# Patient Record
Sex: FEMALE | Race: WHITE | Employment: UNEMPLOYED | URBAN - NONMETROPOLITAN AREA
[De-identification: names, ages, dates, MRNs, and addresses within clinical notes are randomized per-mention and may not be internally consistent; named-entity substitution may affect disease eponyms.]

---

## 2024-10-02 ENCOUNTER — OFFICE VISIT (OUTPATIENT)
Dept: OBGYN | Age: 24
End: 2024-10-02
Payer: COMMERCIAL

## 2024-10-02 ENCOUNTER — HOSPITAL ENCOUNTER (OUTPATIENT)
Age: 24
Setting detail: SPECIMEN
Discharge: HOME | End: 2024-10-02
Payer: COMMERCIAL

## 2024-10-02 VITALS
BODY MASS INDEX: 42.37 KG/M2 | HEIGHT: 64 IN | SYSTOLIC BLOOD PRESSURE: 118 MMHG | DIASTOLIC BLOOD PRESSURE: 76 MMHG | WEIGHT: 248.2 LBS

## 2024-10-02 DIAGNOSIS — Z01.419 ENCOUNTER FOR ANNUAL ROUTINE GYNECOLOGICAL EXAMINATION: Primary | ICD-10-CM

## 2024-10-02 DIAGNOSIS — N94.10 DYSPAREUNIA IN FEMALE: ICD-10-CM

## 2024-10-02 DIAGNOSIS — N89.8 VAGINAL DISCHARGE: ICD-10-CM

## 2024-10-02 DIAGNOSIS — N92.6 IRREGULAR MENSES: ICD-10-CM

## 2024-10-02 PROCEDURE — 99395 PREV VISIT EST AGE 18-39: CPT | Performed by: ADVANCED PRACTICE MIDWIFE

## 2024-10-02 PROCEDURE — G8484 FLU IMMUNIZE NO ADMIN: HCPCS | Performed by: ADVANCED PRACTICE MIDWIFE

## 2024-10-02 PROCEDURE — 87070 CULTURE OTHR SPECIMN AEROBIC: CPT

## 2024-10-02 RX ORDER — NORGESTIMATE AND ETHINYL ESTRADIOL 0.25-0.035
1 KIT ORAL DAILY
Qty: 1 PACKET | Refills: 12 | Status: SHIPPED | OUTPATIENT
Start: 2024-10-02

## 2024-10-02 SDOH — ECONOMIC STABILITY: FOOD INSECURITY: WITHIN THE PAST 12 MONTHS, YOU WORRIED THAT YOUR FOOD WOULD RUN OUT BEFORE YOU GOT MONEY TO BUY MORE.: NEVER TRUE

## 2024-10-02 SDOH — ECONOMIC STABILITY: FOOD INSECURITY: WITHIN THE PAST 12 MONTHS, THE FOOD YOU BOUGHT JUST DIDN'T LAST AND YOU DIDN'T HAVE MONEY TO GET MORE.: NEVER TRUE

## 2024-10-02 SDOH — ECONOMIC STABILITY: INCOME INSECURITY: HOW HARD IS IT FOR YOU TO PAY FOR THE VERY BASICS LIKE FOOD, HOUSING, MEDICAL CARE, AND HEATING?: SOMEWHAT HARD

## 2024-10-02 ASSESSMENT — ENCOUNTER SYMPTOMS
ABDOMINAL PAIN: 0
SHORTNESS OF BREATH: 0
BACK PAIN: 0

## 2024-10-02 NOTE — PROGRESS NOTES
Partners: Male    Birth control/protection: Pill       Any bleeding or pain with intercourse: Yes    Last Yearly date:  08/07/2023    Last pap date : Date of last Cervical Cancer screen (HPV or PAP): 8/7/2023     results: negative    Last HPV date and results: never    Has pt ever had an abnormal:No    IF yes result and date: never     Mammogram Result (most recent):  No results found for this or any previous visit from the past 3650 days.        DEXA Result (most recent):  No results found for this or any previous visit from the past 3650 days.        Last colorectal screen- type:never  date  never results never    Do you do self breast exams: No    Past Medical History:   Diagnosis Date    Encounter for planned induction of labor 02/15/2024    Irregular menses     Seasonal allergies        Past Surgical History:   Procedure Laterality Date    WISDOM TOOTH EXTRACTION  2022       Family History   Problem Relation Age of Onset    Cancer Paternal Grandfather     No Known Problems Paternal Grandmother     No Known Problems Maternal Grandmother     Parkinson's Disease Maternal Grandfather     No Known Problems Father     High Blood Pressure Mother     No Known Problems Sister     Breast Cancer Neg Hx     Ovarian Cancer Neg Hx        Chief Complaint   Patient presents with    Gynecologic Exam     Yearly exam. Last pap 08/07/2023 negative. C/O painful intercourse since birth of baby . Patient is no longer breastfeeding. Currently taking Ortho Micronor.           PE:  Vital Signs  Blood pressure 118/76, height 1.626 m (5' 4\"), weight 112.6 kg (248 lb 3.2 oz), last menstrual period 08/16/2024, not currently breastfeeding.  Estimated body mass index is 42.6 kg/m² as calculated from the following:    Height as of this encounter: 1.626 m (5' 4\").    Weight as of this encounter: 112.6 kg (248 lb 3.2 oz).    Labs:    No results found for this visit on 10/02/24.    No data recorded    NURSE: Young MESA    HPI: here for annual

## 2024-10-05 LAB
MICROORGANISM SPEC CULT: ABNORMAL
SERVICE CMNT-IMP: ABNORMAL
SPECIMEN DESCRIPTION: ABNORMAL

## 2024-10-07 RX ORDER — FLUCONAZOLE 150 MG/1
150 TABLET ORAL DAILY
Qty: 3 TABLET | Refills: 0 | Status: SHIPPED | OUTPATIENT
Start: 2024-10-07 | End: 2024-10-10

## 2025-01-06 ENCOUNTER — OFFICE VISIT (OUTPATIENT)
Dept: OBGYN | Age: 25
End: 2025-01-06
Payer: COMMERCIAL

## 2025-01-06 ENCOUNTER — HOSPITAL ENCOUNTER (OUTPATIENT)
Age: 25
Setting detail: SPECIMEN
Discharge: HOME | End: 2025-01-06
Payer: COMMERCIAL

## 2025-01-06 VITALS — HEIGHT: 64 IN | BODY MASS INDEX: 42.34 KG/M2 | WEIGHT: 248 LBS

## 2025-01-06 DIAGNOSIS — O20.0 THREATENED ABORTION: Primary | ICD-10-CM

## 2025-01-06 PROCEDURE — 84702 CHORIONIC GONADOTROPIN TEST: CPT

## 2025-01-06 PROCEDURE — 99213 OFFICE O/P EST LOW 20 MIN: CPT | Performed by: ADVANCED PRACTICE MIDWIFE

## 2025-01-06 NOTE — PROGRESS NOTES
PROBLEM VISIT     Date of service: 2025    Lovely Valladares  Is a 24 y.o. , female    PT's PCP is: Fely Washington APRN - CNP     : 2000                                             Subjective:       No LMP recorded (lmp unknown).   OB History    Para Term  AB Living   1 1 1     1   SAB IAB Ectopic Molar Multiple Live Births           0 1      # Outcome Date GA Lbr Lai/2nd Weight Sex Type Anes PTL Lv   1 Term 24 39w3d / 01:04 3.756 kg (8 lb 4.5 oz) F Vag-Spont EPI N ECLESTINE      Complications: Gestational hypertension, third trimester, Pre-eclampsia, mild        Social History     Tobacco Use   Smoking Status Never   Smokeless Tobacco Never        Social History     Substance and Sexual Activity   Alcohol Use Yes    Alcohol/week: 1.0 standard drink of alcohol    Types: 1 Glasses of wine per week    Comment: social       Allergies: Pcn [penicillins]      Current Outpatient Medications:     norgestimate-ethinyl estradiol (SPRINTEC 28) 0.25-35 MG-MCG per tablet, Take 1 tablet by mouth daily (Patient not taking: Reported on 2025), Disp: 1 packet, Rfl: 12    norethindrone (ORTHO MICRONOR) 0.35 MG tablet, Take 1 tablet by mouth daily (Patient not taking: Reported on 2025), Disp: 28 tablet, Rfl: 6    ibuprofen (ADVIL;MOTRIN) 800 MG tablet, Take 1 tablet by mouth every 8 hours as needed for Pain (Patient not taking: Reported on 2025), Disp: 120 tablet, Rfl: 3    Social History     Substance and Sexual Activity   Sexual Activity Yes    Partners: Male    Birth control/protection: Pill       Last Yearly date:  10/2/24    Last pap date and results:  Normal    Last HPV date and results: Never    Chief Complaint   Patient presents with    Abnormal Ultrasound     Patient is being seen to discuss dating US.          PE:  Vital Signs  Height 1.626 m (5' 4\"), weight 112.5 kg (248 lb), not currently breastfeeding.  Estimated body mass index is 42.57 kg/m² as calculated from the

## 2025-01-07 DIAGNOSIS — O20.0 THREATENED ABORTION: ICD-10-CM

## 2025-01-07 LAB — B-HCG SERPL EIA 3RD IS-ACNC: ABNORMAL MIU/ML (ref 0–7)

## 2025-01-07 NOTE — PROGRESS NOTES
Sono today is not as far along as patient thought she should be although she is not sure of lmp/ ovulation.    Sono  6.1 WK IUP  CL:3.9 cm  HR:no fetal heart motion or color flow visualized  RT. OVARY:not visualized.  LT. OVARY:seen, corpus luteum cyst visualized measures 2.0 x 1.7 x 1.5 cm.   Abnormal yolk sac appearance.     Will follow quant hcg and recheck sono in one week                              Assessment and Plan          Diagnosis Orders   1. Threatened   hCG, Quantitative, Pregnancy                I have discontinued Lovely T. Depinet's ibuprofen, norethindrone, and norgestimate-ethinyl estradiol.    Return in about 1 week (around 2025) for ob sono for viability.    She was also counseled on her preventative health maintenance recommendations and follow-up.     There are no Patient Instructions on file for this visit.    BECKY Connell CNM,2025 11:11 PM

## 2025-01-07 NOTE — RESULT ENCOUNTER NOTE
Pt. notified of results and voices understanding. Order placed and patient scheduled for ultrasound 01/14/2025.

## 2025-01-08 ENCOUNTER — HOSPITAL ENCOUNTER (OUTPATIENT)
Age: 25
Discharge: HOME | End: 2025-01-08
Payer: COMMERCIAL

## 2025-01-08 DIAGNOSIS — O20.0 THREATENED ABORTION: ICD-10-CM

## 2025-01-08 LAB — B-HCG SERPL EIA 3RD IS-ACNC: ABNORMAL MIU/ML (ref 0–7)

## 2025-01-08 PROCEDURE — 84702 CHORIONIC GONADOTROPIN TEST: CPT

## 2025-01-08 PROCEDURE — 36415 COLL VENOUS BLD VENIPUNCTURE: CPT

## 2025-01-12 ENCOUNTER — HOSPITAL ENCOUNTER (EMERGENCY)
Age: 25
Discharge: HOME | End: 2025-01-12
Attending: EMERGENCY MEDICINE
Payer: COMMERCIAL

## 2025-01-12 VITALS
TEMPERATURE: 98.1 F | OXYGEN SATURATION: 98 % | RESPIRATION RATE: 18 BRPM | HEART RATE: 89 BPM | DIASTOLIC BLOOD PRESSURE: 76 MMHG | SYSTOLIC BLOOD PRESSURE: 127 MMHG

## 2025-01-12 DIAGNOSIS — O03.9 MISCARRIAGE: Primary | ICD-10-CM

## 2025-01-12 LAB
ABO + RH BLD: NORMAL
ARM BAND NUMBER: NORMAL
B-HCG SERPL EIA 3RD IS-ACNC: 5383 MIU/ML (ref 0–7)
BASOPHILS # BLD: 0.03 K/UL (ref 0–0.2)
BASOPHILS NFR BLD: 0 % (ref 0–2)
BLOOD BANK SAMPLE EXPIRATION: NORMAL
BLOOD GROUP ANTIBODIES SERPL: NEGATIVE
EOSINOPHIL # BLD: 0.21 K/UL (ref 0–0.44)
EOSINOPHILS RELATIVE PERCENT: 2 % (ref 1–4)
ERYTHROCYTE [DISTWIDTH] IN BLOOD BY AUTOMATED COUNT: 12.8 % (ref 11.8–14.4)
HCT VFR BLD AUTO: 33.2 % (ref 36.3–47.1)
HCT VFR BLD AUTO: 37.9 % (ref 36.3–47.1)
HGB BLD-MCNC: 11 G/DL (ref 11.9–15.1)
HGB BLD-MCNC: 12.5 G/DL (ref 11.9–15.1)
IMM GRANULOCYTES # BLD AUTO: <0.03 K/UL (ref 0–0.3)
IMM GRANULOCYTES NFR BLD: 0 %
LYMPHOCYTES NFR BLD: 3.99 K/UL (ref 1.1–3.7)
LYMPHOCYTES RELATIVE PERCENT: 42 % (ref 24–43)
MCH RBC QN AUTO: 27.2 PG (ref 25.2–33.5)
MCHC RBC AUTO-ENTMCNC: 33 G/DL (ref 28.4–34.8)
MCV RBC AUTO: 82.4 FL (ref 82.6–102.9)
MONOCYTES NFR BLD: 0.67 K/UL (ref 0.1–1.2)
MONOCYTES NFR BLD: 7 % (ref 3–12)
NEUTROPHILS NFR BLD: 49 % (ref 36–65)
NEUTS SEG NFR BLD: 4.54 K/UL (ref 1.5–8.1)
NRBC BLD-RTO: 0 PER 100 WBC
PLATELET # BLD AUTO: 228 K/UL (ref 138–453)
PMV BLD AUTO: 10.7 FL (ref 8.1–13.5)
RBC # BLD AUTO: 4.6 M/UL (ref 3.95–5.11)
WBC OTHER # BLD: 9.5 K/UL (ref 3.5–11.3)

## 2025-01-12 PROCEDURE — 99284 EMERGENCY DEPT VISIT MOD MDM: CPT

## 2025-01-12 PROCEDURE — 85018 HEMOGLOBIN: CPT

## 2025-01-12 PROCEDURE — 85014 HEMATOCRIT: CPT

## 2025-01-12 PROCEDURE — 85025 COMPLETE CBC W/AUTO DIFF WBC: CPT

## 2025-01-12 PROCEDURE — 86850 RBC ANTIBODY SCREEN: CPT

## 2025-01-12 PROCEDURE — 86900 BLOOD TYPING SEROLOGIC ABO: CPT

## 2025-01-12 PROCEDURE — 6360000002 HC RX W HCPCS: Performed by: EMERGENCY MEDICINE

## 2025-01-12 PROCEDURE — 84702 CHORIONIC GONADOTROPIN TEST: CPT

## 2025-01-12 PROCEDURE — 96374 THER/PROPH/DIAG INJ IV PUSH: CPT

## 2025-01-12 PROCEDURE — 86901 BLOOD TYPING SEROLOGIC RH(D): CPT

## 2025-01-12 PROCEDURE — 2580000003 HC RX 258: Performed by: EMERGENCY MEDICINE

## 2025-01-12 RX ORDER — 0.9 % SODIUM CHLORIDE 0.9 %
1000 INTRAVENOUS SOLUTION INTRAVENOUS ONCE
Status: COMPLETED | OUTPATIENT
Start: 2025-01-12 | End: 2025-01-12

## 2025-01-12 RX ORDER — KETOROLAC TROMETHAMINE 30 MG/ML
30 INJECTION, SOLUTION INTRAMUSCULAR; INTRAVENOUS ONCE
Status: COMPLETED | OUTPATIENT
Start: 2025-01-12 | End: 2025-01-12

## 2025-01-12 RX ADMIN — SODIUM CHLORIDE 1000 ML: 9 INJECTION, SOLUTION INTRAVENOUS at 04:07

## 2025-01-12 RX ADMIN — KETOROLAC TROMETHAMINE 30 MG: 30 INJECTION, SOLUTION INTRAMUSCULAR at 04:07

## 2025-01-12 ASSESSMENT — PAIN DESCRIPTION - LOCATION: LOCATION: ABDOMEN

## 2025-01-12 ASSESSMENT — PAIN SCALES - GENERAL: PAINLEVEL_OUTOF10: 4

## 2025-01-12 ASSESSMENT — LIFESTYLE VARIABLES
HOW MANY STANDARD DRINKS CONTAINING ALCOHOL DO YOU HAVE ON A TYPICAL DAY: PATIENT DOES NOT DRINK
HOW OFTEN DO YOU HAVE A DRINK CONTAINING ALCOHOL: NEVER

## 2025-01-12 NOTE — ED PROVIDER NOTES
[facility] EMERGENCY DEPARTMENT  EMERGENCY DEPARTMENT ENCOUNTER      Pt Name: Lovely Valladares  MRN: [id number]  Birthdate 2000  Date of evaluation: 1/12/2025  Provider: Pat Jensen DO    CHIEF COMPLAINT       Chief Complaint   Patient presents with    Vaginal Bleeding     Concerned for possible miscarriage. States 6 weeks pregnant, light bleeding x1 week. Pt woke up 2 hours PTA with bright red bleeding with clots and cramping. Pt states she has gone through 2 pads in last 2 hours         HISTORY OF PRESENT ILLNESS   (Location/Symptom, Timing/Onset, Context/Setting, Quality, Duration, Modifying Factors, Severity)  Note limiting factors.   Lovely Valladares is a 24 y.o. female who presents to the emergency department with vaginal bleeding and possible miscarriage.  Patient is approximately 6 weeks pregnant based on the ultrasound that was done in the clinic 2 days ago by her OB.  Unfortunately during that visit they were not able to detect any fetal heart tones and she had an abnormal yolk sac and her hCG was trending down from 14,000 to 11,000.  Patient states that she has been spotting for the last 1 week but yesterday the bleeding was a little more than usual.  This morning her abdominal cramping woke her up approximately 2 hours before coming to the emergency department and she has gone through 2 pads in the last 2 hours.  Upon arrival to the emergency department patient did use the bathroom and seems like she did pass a tissue like structure.  She denies any dizziness or lightheadedness.  This is patient's second pregnancy and she has 1 child at home.  Patient states that they got pregnant while she was on birth control and breast-feeding.  Patient's blood type is be positive.    REVIEW OF SYSTEMS    (2-9 systems for level 4, 10 or more for level 5)     Review of Systems   Constitutional:  Negative for fatigue and fever.   HENT:  Negative for congestion and sore throat.    Eyes:  Negative for visual

## 2025-01-12 NOTE — DISCHARGE INSTRUCTIONS
Keep an eye on your bleeding.  Return to the emergency department if your bleeding gets worse and you are going through 1 pad every hour.  Otherwise keep your appointment with your OB in the next 2 to 3 days to ensure that your hCG levels are trending down.  You can take Tylenol and ibuprofen as needed.

## 2025-01-13 SDOH — ECONOMIC STABILITY: FOOD INSECURITY: WITHIN THE PAST 12 MONTHS, THE FOOD YOU BOUGHT JUST DIDN'T LAST AND YOU DIDN'T HAVE MONEY TO GET MORE.: NEVER TRUE

## 2025-01-13 SDOH — ECONOMIC STABILITY: TRANSPORTATION INSECURITY
IN THE PAST 12 MONTHS, HAS THE LACK OF TRANSPORTATION KEPT YOU FROM MEDICAL APPOINTMENTS OR FROM GETTING MEDICATIONS?: NO

## 2025-01-13 SDOH — ECONOMIC STABILITY: INCOME INSECURITY: IN THE LAST 12 MONTHS, WAS THERE A TIME WHEN YOU WERE NOT ABLE TO PAY THE MORTGAGE OR RENT ON TIME?: NO

## 2025-01-13 SDOH — ECONOMIC STABILITY: TRANSPORTATION INSECURITY
IN THE PAST 12 MONTHS, HAS LACK OF TRANSPORTATION KEPT YOU FROM MEETINGS, WORK, OR FROM GETTING THINGS NEEDED FOR DAILY LIVING?: NO

## 2025-01-13 SDOH — ECONOMIC STABILITY: FOOD INSECURITY: WITHIN THE PAST 12 MONTHS, YOU WORRIED THAT YOUR FOOD WOULD RUN OUT BEFORE YOU GOT MONEY TO BUY MORE.: NEVER TRUE

## 2025-01-14 ENCOUNTER — OFFICE VISIT (OUTPATIENT)
Dept: OBGYN | Age: 25
End: 2025-01-14

## 2025-01-14 ENCOUNTER — ROUTINE PRENATAL (OUTPATIENT)
Dept: OBGYN | Age: 25
End: 2025-01-14

## 2025-01-14 VITALS
BODY MASS INDEX: 43.33 KG/M2 | DIASTOLIC BLOOD PRESSURE: 74 MMHG | WEIGHT: 253.8 LBS | HEIGHT: 64 IN | SYSTOLIC BLOOD PRESSURE: 122 MMHG

## 2025-01-14 DIAGNOSIS — O03.4 INCOMPLETE ABORTION: Primary | ICD-10-CM

## 2025-01-14 DIAGNOSIS — O20.0 THREATENED ABORTION: Primary | ICD-10-CM

## 2025-01-14 PROCEDURE — G8417 CALC BMI ABV UP PARAM F/U: HCPCS | Performed by: ADVANCED PRACTICE MIDWIFE

## 2025-01-14 PROCEDURE — 1036F TOBACCO NON-USER: CPT | Performed by: ADVANCED PRACTICE MIDWIFE

## 2025-01-14 PROCEDURE — 99213 OFFICE O/P EST LOW 20 MIN: CPT | Performed by: ADVANCED PRACTICE MIDWIFE

## 2025-01-14 PROCEDURE — G8427 DOCREV CUR MEDS BY ELIG CLIN: HCPCS | Performed by: ADVANCED PRACTICE MIDWIFE

## 2025-01-14 ASSESSMENT — PATIENT HEALTH QUESTIONNAIRE - PHQ9
SUM OF ALL RESPONSES TO PHQ QUESTIONS 1-9: 0
2. FEELING DOWN, DEPRESSED OR HOPELESS: NOT AT ALL
1. LITTLE INTEREST OR PLEASURE IN DOING THINGS: NOT AT ALL
SUM OF ALL RESPONSES TO PHQ QUESTIONS 1-9: 0
SUM OF ALL RESPONSES TO PHQ9 QUESTIONS 1 & 2: 0

## 2025-01-14 NOTE — PROGRESS NOTES
PROBLEM VISIT     Date of service: 2025    Lovely Valladares  Is a 24 y.o. , female    PT's PCP is: Fely Washington APRN - CNP     : 2000                                             Subjective:       No LMP recorded (lmp unknown). Patient is pregnant.     OB History    Para Term  AB Living   2 1 1     1   SAB IAB Ectopic Molar Multiple Live Births           0 1      # Outcome Date GA Lbr Lai/2nd Weight Sex Type Anes PTL Lv   2 Current            1 Term 24 39w3d / 01:04 3.756 kg (8 lb 4.5 oz) F Vag-Spont EPI N CELESTINE      Complications: Gestational hypertension, third trimester, Pre-eclampsia, mild        Social History     Tobacco Use   Smoking Status Never   Smokeless Tobacco Never        Social History     Substance and Sexual Activity   Alcohol Use Yes    Alcohol/week: 1.0 standard drink of alcohol    Types: 1 Glasses of wine per week    Comment: social       Social History     Substance and Sexual Activity   Sexual Activity Yes    Partners: Male       Allergies: Pcn [penicillins]    Chief Complaint   Patient presents with    Miscarriage     Follow up in house ultrasound. Patient was in ED 2025 with heavy  bleeding with cramps. Bleeding has slowed down significantly .        Last Yearly date:  2023    Last pap date and results: 2023 negative    Last HPV date and results: never    PE:  Vital Signs  Blood pressure 122/74, height 1.626 m (5' 4\"), weight 115.1 kg (253 lb 12.8 oz), not currently breastfeeding.  Estimated body mass index is 43.56 kg/m² as calculated from the following:    Height as of this encounter: 1.626 m (5' 4\").    Weight as of this encounter: 115.1 kg (253 lb 12.8 oz).    PHQ-9 Total Score: 0 (2025 11:38 AM)        NURSE: Young MESA    HPI: here for f/u to sab, is still bleeding but slowing down, quants dropped from approx 43404 to 5000      PT denies fever, chills, nausea and vomiting       Objective: No acute distress  Excellent

## 2025-01-23 ENCOUNTER — HOSPITAL ENCOUNTER (OUTPATIENT)
Age: 25
Discharge: HOME | End: 2025-01-23
Payer: COMMERCIAL

## 2025-01-23 DIAGNOSIS — O03.9 SAB (SPONTANEOUS ABORTION): Primary | ICD-10-CM

## 2025-01-23 DIAGNOSIS — O03.4 INCOMPLETE ABORTION: ICD-10-CM

## 2025-01-23 LAB — B-HCG SERPL EIA 3RD IS-ACNC: 24.8 MIU/ML (ref 0–7)

## 2025-01-23 PROCEDURE — 84702 CHORIONIC GONADOTROPIN TEST: CPT

## 2025-01-23 PROCEDURE — 36415 COLL VENOUS BLD VENIPUNCTURE: CPT

## 2025-01-27 DIAGNOSIS — O03.9 SAB (SPONTANEOUS ABORTION): Primary | ICD-10-CM

## 2025-01-30 ENCOUNTER — HOSPITAL ENCOUNTER (OUTPATIENT)
Age: 25
Discharge: HOME | End: 2025-01-30
Payer: COMMERCIAL

## 2025-01-30 DIAGNOSIS — O03.9 SAB (SPONTANEOUS ABORTION): ICD-10-CM

## 2025-01-30 LAB — B-HCG SERPL EIA 3RD IS-ACNC: 5.2 MIU/ML (ref 0–7)

## 2025-01-30 PROCEDURE — 36415 COLL VENOUS BLD VENIPUNCTURE: CPT

## 2025-01-30 PROCEDURE — 84702 CHORIONIC GONADOTROPIN TEST: CPT

## 2025-02-06 ENCOUNTER — OFFICE VISIT (OUTPATIENT)
Dept: OBGYN | Age: 25
End: 2025-02-06
Payer: COMMERCIAL

## 2025-02-06 VITALS
WEIGHT: 254 LBS | HEIGHT: 64 IN | BODY MASS INDEX: 43.36 KG/M2 | DIASTOLIC BLOOD PRESSURE: 74 MMHG | SYSTOLIC BLOOD PRESSURE: 130 MMHG

## 2025-02-06 DIAGNOSIS — N92.6 IRREGULAR MENSES: Primary | ICD-10-CM

## 2025-02-06 PROCEDURE — 99213 OFFICE O/P EST LOW 20 MIN: CPT | Performed by: ADVANCED PRACTICE MIDWIFE

## 2025-02-06 PROCEDURE — G8417 CALC BMI ABV UP PARAM F/U: HCPCS | Performed by: ADVANCED PRACTICE MIDWIFE

## 2025-02-06 PROCEDURE — 1036F TOBACCO NON-USER: CPT | Performed by: ADVANCED PRACTICE MIDWIFE

## 2025-02-06 PROCEDURE — G8427 DOCREV CUR MEDS BY ELIG CLIN: HCPCS | Performed by: ADVANCED PRACTICE MIDWIFE

## 2025-02-06 NOTE — PROGRESS NOTES
PROBLEM VISIT     Date of service: 2025    Lovely Valladares  Is a 24 y.o. , female    PT's PCP is: Fely Washington APRN - CNP     : 2000                                             Subjective:       No LMP recorded (lmp unknown).     OB History    Para Term  AB Living   2 1 1   1 1   SAB IAB Ectopic Molar Multiple Live Births   1       0 1      # Outcome Date GA Lbr Lai/2nd Weight Sex Type Anes PTL Lv   2 SAB 2025     SAB      1 Term 24 39w3d / 01:04 3.756 kg (8 lb 4.5 oz) F Vag-Spont EPI N CELESTINE      Complications: Gestational hypertension, third trimester, Pre-eclampsia, mild        Social History     Tobacco Use   Smoking Status Never   Smokeless Tobacco Never        Social History     Substance and Sexual Activity   Alcohol Use Yes    Alcohol/week: 1.0 standard drink of alcohol    Types: 1 Glasses of wine per week    Comment: social       Social History     Substance and Sexual Activity   Sexual Activity Yes    Partners: Male       Allergies: Pcn [penicillins]    Chief Complaint   Patient presents with    Miscarriage     Follow up SAB, last HCG quant 5.2 on 2025. Discuss cycle control .        Last Yearly date: 2023    Last pap date and results: 2023 negative    Last HPV date and results: never    PE:  Vital Signs  Blood pressure 130/74, height 1.626 m (5' 4\"), weight 115.2 kg (254 lb), not currently breastfeeding.  Estimated body mass index is 43.6 kg/m² as calculated from the following:    Height as of this encounter: 1.626 m (5' 4\").    Weight as of this encounter: 115.2 kg (254 lb).    No data recorded      NURSE: Young MESA    HPI: here for miscarriage follow up;  complete sab, desires cycle control rather than another pregnancy at this time      PT denies fever, chills, nausea and vomiting       Objective: No acute distress  Excellent communications  Well-nourished     Results reviewed today:    No results found for this visit on 25.

## 2025-03-12 ENCOUNTER — PROCEDURE VISIT (OUTPATIENT)
Dept: OBGYN | Age: 25
End: 2025-03-12

## 2025-03-12 VITALS
BODY MASS INDEX: 43.54 KG/M2 | SYSTOLIC BLOOD PRESSURE: 122 MMHG | DIASTOLIC BLOOD PRESSURE: 70 MMHG | HEIGHT: 64 IN | WEIGHT: 255 LBS

## 2025-03-12 DIAGNOSIS — Z30.430 ENCOUNTER FOR INSERTION OF INTRAUTERINE CONTRACEPTIVE DEVICE: ICD-10-CM

## 2025-03-12 DIAGNOSIS — N92.6 IRREGULAR MENSES: ICD-10-CM

## 2025-03-12 DIAGNOSIS — Z01.812 PRE-PROCEDURE LAB EXAM: Primary | ICD-10-CM

## 2025-03-12 LAB
CONTROL: PRESENT
PREGNANCY TEST URINE, POC: NEGATIVE

## 2025-03-12 NOTE — PROGRESS NOTES
The patient is a 24 y.o. female that presents for IUD insertion for  irregular periods    OB History          2    Para   1    Term   1            AB   1    Living   1         SAB   1    IAB        Ectopic        Molar        Multiple   0    Live Births   1                Allergies: Pcn [penicillins]    Vitals: Blood pressure 122/70, height 1.626 m (5' 4\"), weight 115.7 kg (255 lb), last menstrual period 2025, not currently breastfeeding.    Pt requests chaperone: No      Last coitus: 1  month(s)    Premedicated with Motrin 800 mg: No    Cytotec 200mcg:No    UCG: negative    Consent signed:  Yes    PROCEDURE:    Mirena      Bimanual exam: anteverted    Cervix cleansed with: Betadinex3    Tenaculum applied: Yes     Uterus sounded: 8cm    Mirena inserted without difficulty. IUD strings trimmed to 2 cm.     Assessment:  Assessment & Plan  Pre-procedure lab exam       Orders:    POCT urine pregnancy  negative  Irregular menses   Chronic, not at goal (unstable), continue current treatment plan         Encounter for insertion of intrauterine contraceptive device   Chronic, not at goal (unstable), continue current treatment plan    Orders:    levonorgestrel (MIRENA) IUD 52 mg 1 each    INSERT INTRAUTERINE DEVICE        Plan:  Education on IUD  Abstain from intercourse for 72 hours  Motrin 800 mg Q 8 hours PRN  RTO one month for ultrasound for sting check.  See MAR for lot # and NDC #    Return in about 1 month (around 2025) for string check.    Melany Damon, BECKY - BERNICE,3/12/2025 5:52 PM

## 2025-04-10 ENCOUNTER — OFFICE VISIT (OUTPATIENT)
Dept: OBGYN | Age: 25
End: 2025-04-10

## 2025-04-10 VITALS
HEIGHT: 64 IN | BODY MASS INDEX: 44.22 KG/M2 | DIASTOLIC BLOOD PRESSURE: 66 MMHG | SYSTOLIC BLOOD PRESSURE: 116 MMHG | WEIGHT: 259 LBS

## 2025-04-10 DIAGNOSIS — Z97.5 PRESENCE OF IUD: Primary | ICD-10-CM

## 2025-04-10 NOTE — PROGRESS NOTES
vomiting       Objective   No acute distress  Excellent communications  Well-nourished      Pelvic Exam: GENITAL/URINARY:  External Genitalia:  General appearance; normal, Hair distribution; normal, Lesions absent  Urethral Meatus:  Size normal, Location normal, Lesions absent, Prolapse absent  Urethra:  Fullness absent, Masses absent  Bladder:  Fullness absent, Masses absent, Tenderness absent, Cystocele absent  Vagina:  General appearance normal, Estrogen effect normal, Discharge absent, Lesions absent, Pelvic support normal  Cervix:  General appearance normal, Lesions absent, Discharge absent, Tenderness absent, Enlargement absent, Nodularity absent, string present from os  Uterus:  Size normal, Tenderness absent  Adenexa:  Masses absent, Tenderness absent  Anus/Perineum:  Lesions absent and Masses absent                                                         Results reviewed today:    No results found for this visit on 04/10/25.      Assessment and Plan         Assessment & Plan  Presence of IUD   Chronic, at goal (stable), continue current treatment plan                 I am having Lovely Valladares maintain her (Levonorgestrel (MIRENA, 52 MG, IU)).    Return in about 1 year (around 4/10/2026) for yearly.    There are no Patient Instructions on file for this visit.    Time spent 15 minutes      BECKY Connell CNM,4/10/2025 2:36 PM